# Patient Record
(demographics unavailable — no encounter records)

---

## 2025-06-23 NOTE — PROCEDURE
[FreeTextEntry1] : Inject right ankle with 1.0 cc each of Dexamethasone Phosphate and 2% Lidocaine plain.

## 2025-06-23 NOTE — HISTORY OF PRESENT ILLNESS
[FreeTextEntry1] :  Subjective:   - Summary: Patient presents with painful swollen right ankle for several days with increasing severity.   - Chief Complaint (CC): Painful swollen right ankle   - History of Present Illness (HPI): Patient reports a painful swollen right ankle that has been present for several days. The condition has been increasing in severity over time.   Objective:   - Diagnostic Results:    - Vital Signs:    - Physical Examination (PE): DP 2/4 bilaterally, PT 1/4 bilaterally. Capillary refill within normal limits at 1 second times ten.  TG: WNL B/L Pain and heat upon palpation of the medial aspect of the right ankle. Neurovascular status is intact bilaterally.   Assessment:   - Summary: Based on the presentation and examination, the patient is diagnosed with gout in the right ankle.   - Problems:     - Gout, right ankl   Plan:   - Summary: Treatment plan includes local injection, oral medications for gout management, and follow-up care.   - Plan: -Exam.     - Inject right ankle with 1.0 cc each of dexamethasone phosphate and 2% lidocaine plain.     - Prescribe colchicine 0.6 mg PO Q12H for gout management.     - Prescribe indomethacin 50 mg PO PC TID.     - Schedule follow-up appointment PRN.